# Patient Record
Sex: FEMALE | Race: ASIAN | NOT HISPANIC OR LATINO
[De-identification: names, ages, dates, MRNs, and addresses within clinical notes are randomized per-mention and may not be internally consistent; named-entity substitution may affect disease eponyms.]

---

## 2017-02-09 ENCOUNTER — APPOINTMENT (OUTPATIENT)
Dept: CARDIOLOGY | Facility: CLINIC | Age: 77
End: 2017-02-09

## 2017-03-27 ENCOUNTER — APPOINTMENT (OUTPATIENT)
Dept: CARDIOLOGY | Facility: CLINIC | Age: 77
End: 2017-03-27

## 2017-03-27 ENCOUNTER — NON-APPOINTMENT (OUTPATIENT)
Age: 77
End: 2017-03-27

## 2017-03-27 VITALS
HEART RATE: 59 BPM | BODY MASS INDEX: 25.1 KG/M2 | HEIGHT: 64 IN | DIASTOLIC BLOOD PRESSURE: 79 MMHG | OXYGEN SATURATION: 100 % | WEIGHT: 147 LBS | SYSTOLIC BLOOD PRESSURE: 165 MMHG

## 2017-03-27 VITALS — SYSTOLIC BLOOD PRESSURE: 135 MMHG | DIASTOLIC BLOOD PRESSURE: 80 MMHG

## 2017-03-27 DIAGNOSIS — R20.2 PARESTHESIA OF SKIN: ICD-10-CM

## 2017-03-27 RX ORDER — PRAVASTATIN SODIUM 40 MG/1
40 TABLET ORAL
Qty: 90 | Refills: 0 | Status: ACTIVE | COMMUNITY
Start: 2017-03-15

## 2017-04-06 ENCOUNTER — APPOINTMENT (OUTPATIENT)
Dept: CV DIAGNOSITCS | Facility: HOSPITAL | Age: 77
End: 2017-04-06

## 2017-04-06 ENCOUNTER — OUTPATIENT (OUTPATIENT)
Dept: OUTPATIENT SERVICES | Facility: HOSPITAL | Age: 77
LOS: 1 days | End: 2017-04-06
Payer: MEDICARE

## 2017-04-06 DIAGNOSIS — Z86.79 PERSONAL HISTORY OF OTHER DISEASES OF THE CIRCULATORY SYSTEM: ICD-10-CM

## 2017-04-06 PROCEDURE — 93306 TTE W/DOPPLER COMPLETE: CPT

## 2017-04-06 PROCEDURE — 93306 TTE W/DOPPLER COMPLETE: CPT | Mod: 26

## 2017-04-17 DIAGNOSIS — I42.9 CARDIOMYOPATHY, UNSPECIFIED: ICD-10-CM

## 2017-07-20 ENCOUNTER — APPOINTMENT (OUTPATIENT)
Dept: CARDIOLOGY | Facility: CLINIC | Age: 77
End: 2017-07-20

## 2017-07-20 ENCOUNTER — NON-APPOINTMENT (OUTPATIENT)
Age: 77
End: 2017-07-20

## 2017-07-20 VITALS — DIASTOLIC BLOOD PRESSURE: 70 MMHG | SYSTOLIC BLOOD PRESSURE: 132 MMHG

## 2017-07-20 VITALS
HEART RATE: 64 BPM | HEIGHT: 64 IN | OXYGEN SATURATION: 96 % | WEIGHT: 150 LBS | DIASTOLIC BLOOD PRESSURE: 78 MMHG | SYSTOLIC BLOOD PRESSURE: 152 MMHG | BODY MASS INDEX: 25.61 KG/M2

## 2018-04-10 ENCOUNTER — CHART COPY (OUTPATIENT)
Age: 78
End: 2018-04-10

## 2018-04-11 ENCOUNTER — APPOINTMENT (OUTPATIENT)
Dept: CARDIOLOGY | Facility: CLINIC | Age: 78
End: 2018-04-11
Payer: MEDICARE

## 2018-04-11 ENCOUNTER — OUTPATIENT (OUTPATIENT)
Dept: OUTPATIENT SERVICES | Facility: HOSPITAL | Age: 78
LOS: 1 days | End: 2018-04-11
Payer: MEDICARE

## 2018-04-11 ENCOUNTER — APPOINTMENT (OUTPATIENT)
Dept: CV DIAGNOSITCS | Facility: HOSPITAL | Age: 78
End: 2018-04-11

## 2018-04-11 ENCOUNTER — NON-APPOINTMENT (OUTPATIENT)
Age: 78
End: 2018-04-11

## 2018-04-11 VITALS
WEIGHT: 150 LBS | OXYGEN SATURATION: 98 % | HEART RATE: 51 BPM | DIASTOLIC BLOOD PRESSURE: 69 MMHG | BODY MASS INDEX: 25.61 KG/M2 | SYSTOLIC BLOOD PRESSURE: 130 MMHG | HEIGHT: 64 IN

## 2018-04-11 DIAGNOSIS — R20.2 PARESTHESIA OF SKIN: ICD-10-CM

## 2018-04-11 PROCEDURE — 93306 TTE W/DOPPLER COMPLETE: CPT | Mod: 26

## 2018-04-11 PROCEDURE — 93306 TTE W/DOPPLER COMPLETE: CPT

## 2018-04-11 PROCEDURE — 99214 OFFICE O/P EST MOD 30 MIN: CPT

## 2018-04-11 PROCEDURE — 93000 ELECTROCARDIOGRAM COMPLETE: CPT

## 2018-11-19 ENCOUNTER — NON-APPOINTMENT (OUTPATIENT)
Age: 78
End: 2018-11-19

## 2018-11-19 ENCOUNTER — APPOINTMENT (OUTPATIENT)
Dept: CARDIOLOGY | Facility: CLINIC | Age: 78
End: 2018-11-19
Payer: MEDICARE

## 2018-11-19 VITALS
SYSTOLIC BLOOD PRESSURE: 115 MMHG | OXYGEN SATURATION: 96 % | HEIGHT: 64 IN | BODY MASS INDEX: 26.8 KG/M2 | WEIGHT: 157 LBS | HEART RATE: 61 BPM | DIASTOLIC BLOOD PRESSURE: 72 MMHG

## 2018-11-19 PROCEDURE — 99214 OFFICE O/P EST MOD 30 MIN: CPT

## 2018-11-19 PROCEDURE — 93000 ELECTROCARDIOGRAM COMPLETE: CPT

## 2018-11-19 NOTE — PHYSICAL EXAM
[General Appearance - Well Developed] : well developed [Normal Appearance] : normal appearance [Well Groomed] : well groomed [General Appearance - Well Nourished] : well nourished [No Deformities] : no deformities [General Appearance - In No Acute Distress] : no acute distress [Normal Conjunctiva] : the conjunctiva exhibited no abnormalities [Eyelids - No Xanthelasma] : the eyelids demonstrated no xanthelasmas [Normal Oral Mucosa] : normal oral mucosa [No Oral Pallor] : no oral pallor [No Oral Cyanosis] : no oral cyanosis [Normal Jugular Venous A Waves Present] : normal jugular venous A waves present [Normal Jugular Venous V Waves Present] : normal jugular venous V waves present [No Jugular Venous Kovacs A Waves] : no jugular venous kovacs A waves [Respiration, Rhythm And Depth] : normal respiratory rhythm and effort [Exaggerated Use Of Accessory Muscles For Inspiration] : no accessory muscle use [Auscultation Breath Sounds / Voice Sounds] : lungs were clear to auscultation bilaterally [Heart Rate And Rhythm] : heart rate and rhythm were normal [Heart Sounds] : normal S1 and S2 [Murmurs] : no murmurs present [Abdomen Soft] : soft [Abdomen Tenderness] : non-tender [Abdomen Mass (___ Cm)] : no abdominal mass palpated [Abnormal Walk] : normal gait [Gait - Sufficient For Exercise Testing] : the gait was sufficient for exercise testing [Nail Clubbing] : no clubbing of the fingernails [Cyanosis, Localized] : no localized cyanosis [Petechial Hemorrhages (___cm)] : no petechial hemorrhages [Skin Color & Pigmentation] : normal skin color and pigmentation [] : no rash [No Venous Stasis] : no venous stasis [Skin Lesions] : no skin lesions [No Skin Ulcers] : no skin ulcer [No Xanthoma] : no  xanthoma was observed [Oriented To Time, Place, And Person] : oriented to person, place, and time [Affect] : the affect was normal [Mood] : the mood was normal [No Anxiety] : not feeling anxious

## 2018-11-19 NOTE — HISTORY OF PRESENT ILLNESS
[FreeTextEntry1] : patient is a 78-year-old female with history of diabetes, hypertension presented to follow up post multiple hospitalizations for a Takotsubo's syndrome with WENDY on B-blockers and feeling well with no new symptoms at this time. Dottie's PMD was concerned about her low HR on the Metoprolol but she denies any symptoms of dizziness, lightheadiness or syncope. \par \par Patient initially presented to our emergency room September 5, 2014 with chest pain and found to have positive cardiac enzymes. Patient had an echocardiogram that revealed severe segmental left ventricular dysfunction with left ventricular outflow tract obstruction with peak gradient of 64 mmHg. 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 Patient was treated with beta blockers and IV fluid. Prior to discharge on September 14, 2014 patient had a repeat echocardiogram that revealed mild segmental ventricular dysfunction.\par \par patient was rehospitalized in [place] on September 13, 2015 for worsening chest discomfort, was found to have dynamic EKG changes and sent for cardiac catheterization that revealed normal coronary arteries with significant left ventricular dysfunction LVOT hypercontractility. on September 14 patient had a repeat echocardiogram that showed improvement of her left ventricular function to an ejection fraction of 50%. Patient was discharged on her home regimen including Lopressor 12.5 twice a day.\par \par Today the patient feels well - denies chest pain, shortness of breath, palpitations, lightheadedness or syncope. the patient has been attempting to be more active by walking more frequently with her  and doing quan chi.

## 2018-11-19 NOTE — DISCUSSION/SUMMARY
[FreeTextEntry1] : patient is a 78-year-old female with history of diabetes, hypertension presented to follow up post recent hospitalization for a Takotsubo's syndrome with WENDY on B-blockers and feeling well with no new symptoms at this time. with subsequent LV function normalization and subsequent recurrent similar event on September 13, 2015 s/p repeat cath with normal coronaries.  patient is presently asymptomatic with no signs or symptoms of a complete heart failure, unstable angina or arrhythmias.\par - advised to continue her prior dose of the Metoprolol 25 mg bid; \par - ECG with no new changes\par - BP stable - slightly elevated - will monitor at home and monitor the salt intake\par - the echo from april 2018  shows complete normalization of the LV with EF of 65-70% with WENDY and minimal MR - no need for further work up at this time - patine tis asymptomatic\par - patient will continue Lopressor  25 mg twice daily. Patient was encouraged to continue with her current medical therapy with the hope of reducing the chances of recurrent stress-induced cardiomyopathy.\par - in light of her significant risk factors, will continue with aspirin as primary prevention for coronary artery disease.\par - Patient was encouraged to exercise, decrease salt intake and lose weight.\par - blood work was reviewed with the patient

## 2018-11-19 NOTE — REASON FOR VISIT
[Follow-Up - Clinic] : a clinic follow-up of [Cardiomyopathy] : cardiomyopathy [Hypertension] : hypertension [FreeTextEntry1] : Takotsubo's syndrome

## 2019-06-03 ENCOUNTER — APPOINTMENT (OUTPATIENT)
Dept: CARDIOLOGY | Facility: CLINIC | Age: 79
End: 2019-06-03

## 2020-01-02 ENCOUNTER — APPOINTMENT (OUTPATIENT)
Dept: CARDIOLOGY | Facility: CLINIC | Age: 80
End: 2020-01-02
Payer: MEDICARE

## 2020-01-02 ENCOUNTER — NON-APPOINTMENT (OUTPATIENT)
Age: 80
End: 2020-01-02

## 2020-01-02 VITALS
DIASTOLIC BLOOD PRESSURE: 77 MMHG | OXYGEN SATURATION: 99 % | HEIGHT: 64 IN | HEART RATE: 63 BPM | SYSTOLIC BLOOD PRESSURE: 162 MMHG

## 2020-01-02 PROCEDURE — 99214 OFFICE O/P EST MOD 30 MIN: CPT

## 2020-01-02 PROCEDURE — 93000 ELECTROCARDIOGRAM COMPLETE: CPT

## 2020-01-03 NOTE — PHYSICAL EXAM
[General Appearance - Well Developed] : well developed [Well Groomed] : well groomed [Normal Appearance] : normal appearance [General Appearance - Well Nourished] : well nourished [No Deformities] : no deformities [General Appearance - In No Acute Distress] : no acute distress [Normal Conjunctiva] : the conjunctiva exhibited no abnormalities [Eyelids - No Xanthelasma] : the eyelids demonstrated no xanthelasmas [Normal Oral Mucosa] : normal oral mucosa [No Oral Pallor] : no oral pallor [No Oral Cyanosis] : no oral cyanosis [Normal Jugular Venous A Waves Present] : normal jugular venous A waves present [Normal Jugular Venous V Waves Present] : normal jugular venous V waves present [No Jugular Venous Kovacs A Waves] : no jugular venous kovacs A waves [Respiration, Rhythm And Depth] : normal respiratory rhythm and effort [Exaggerated Use Of Accessory Muscles For Inspiration] : no accessory muscle use [Auscultation Breath Sounds / Voice Sounds] : lungs were clear to auscultation bilaterally [Heart Sounds] : normal S1 and S2 [Heart Rate And Rhythm] : heart rate and rhythm were normal [Abdomen Soft] : soft [Murmurs] : no murmurs present [Abdomen Tenderness] : non-tender [Abdomen Mass (___ Cm)] : no abdominal mass palpated [Abnormal Walk] : normal gait [Gait - Sufficient For Exercise Testing] : the gait was sufficient for exercise testing [Cyanosis, Localized] : no localized cyanosis [Nail Clubbing] : no clubbing of the fingernails [Petechial Hemorrhages (___cm)] : no petechial hemorrhages [Skin Color & Pigmentation] : normal skin color and pigmentation [] : no rash [No Skin Ulcers] : no skin ulcer [Skin Lesions] : no skin lesions [No Venous Stasis] : no venous stasis [No Xanthoma] : no  xanthoma was observed [Oriented To Time, Place, And Person] : oriented to person, place, and time [No Anxiety] : not feeling anxious [Affect] : the affect was normal [Mood] : the mood was normal [Normal] : normal [Normal Rate] : normal [Rhythm Regular] : regular [2+] : left 2+

## 2020-01-03 NOTE — ASSESSMENT
[FreeTextEntry1] :  79 year old female with history of diabetes, hypertension with history of  Takotsubo syndrome with WENDY on B-blockers and feeling well with no new symptoms at this time. \par \par Blood pressure today is significantly elevated: \par 162/77\par Patient's daughter acknowledges that her mother eats "very salty" foods\par Added Losartan 25 mg daily\par Encouraged patient and her family to decrease salt intake\par BP log for the next week\par \par Full blood panel: CBC, CMP, Hgb A1C, TSH, Lipid profile\par \par Follow up in 2 months\par \par \par \par

## 2020-01-03 NOTE — HISTORY OF PRESENT ILLNESS
[FreeTextEntry1] :  78-year-old female with history of diabetes, hypertension presented to follow up post multiple hospitalizations for a Takotsubo syndrome with WENDY on B-blockers and feeling well with no new symptoms at this time. Patient's PMD was concerned about her low HR on the Metoprolol but she denies any symptoms of dizziness, light headiness or syncope. \par \par Patient initially presented to our emergency room September 5, 2014 with chest pain and found to have positive cardiac enzymes. Patient had an echocardiogram that revealed severe segmental left ventricular dysfunction with left ventricular outflow tract obstruction with peak gradient of 64 mmHg. 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 Patient was treated with beta blockers and IV fluid. Prior to discharge on September 14, 2014 patient had a repeat echocardiogram that revealed mild segmental ventricular dysfunction.\par \par Patient was rehospitalized in [place] on September 13, 2015 for worsening chest discomfort, was found to have dynamic EKG changes and sent for cardiac catheterization that revealed normal coronary arteries with significant left ventricular dysfunction LVOT hypercontractility. on September 14 patient had a repeat echocardiogram that showed improvement of her left ventricular function to an ejection fraction of 50%. Patient was discharged on her home regimen including Lopressor 12.5 twice a day.\par \par Today the patient feels well - denies chest pain, shortness of breath, palpitations, lightheadedness or syncope. the patient has been attempting to be more active by walking more frequently with her  and doing quan chi.\par \par Interval Note\par January 2, 2020\par \par Patient returns for a cardiac follow up. She is a Mandarin speaking female that is accompanied by her English speaking daughter for translation.\par \par  Blood pressure today is in poor control at 162/77. Her primary care MD had added Losartan 50 mg, however, she did not take this medication out of concern that the medication may have been on recall. \par  does admit to significant dietary indiscretion over the holidays\par \par EKG today is in sinus rhythm @ 63 bpm. \par Most recent echocardiogram was performed on April 11, 2019 which demonstrated :\par systolic anterior motion of the mitral valve, minimal MR, mild AR.\par LVEF 74%\par compared with echo from 2017, no significant changes were identified. \par \par \par \par

## 2020-01-06 LAB
ALBUMIN SERPL ELPH-MCNC: 4.4 G/DL
ALP BLD-CCNC: 59 U/L
ALT SERPL-CCNC: 15 U/L
ANION GAP SERPL CALC-SCNC: 10 MMOL/L
AST SERPL-CCNC: 17 U/L
BASOPHILS # BLD AUTO: 0.01 K/UL
BASOPHILS NFR BLD AUTO: 0.2 %
BILIRUB SERPL-MCNC: 0.2 MG/DL
BUN SERPL-MCNC: 22 MG/DL
CALCIUM SERPL-MCNC: 9.4 MG/DL
CHLORIDE SERPL-SCNC: 104 MMOL/L
CHOLEST SERPL-MCNC: 163 MG/DL
CHOLEST/HDLC SERPL: 2.8 RATIO
CO2 SERPL-SCNC: 27 MMOL/L
CREAT SERPL-MCNC: 0.82 MG/DL
EOSINOPHIL # BLD AUTO: 0.13 K/UL
EOSINOPHIL NFR BLD AUTO: 2.4 %
ESTIMATED AVERAGE GLUCOSE: 143 MG/DL
GLUCOSE SERPL-MCNC: 168 MG/DL
HBA1C MFR BLD HPLC: 6.6 %
HCT VFR BLD CALC: 36 %
HDLC SERPL-MCNC: 59 MG/DL
HGB BLD-MCNC: 11.6 G/DL
IMM GRANULOCYTES NFR BLD AUTO: 0.2 %
LDLC SERPL CALC-MCNC: 75 MG/DL
LYMPHOCYTES # BLD AUTO: 1.7 K/UL
LYMPHOCYTES NFR BLD AUTO: 31.8 %
MAN DIFF?: NORMAL
MCHC RBC-ENTMCNC: 31 PG
MCHC RBC-ENTMCNC: 32.2 GM/DL
MCV RBC AUTO: 96.3 FL
MONOCYTES # BLD AUTO: 0.39 K/UL
MONOCYTES NFR BLD AUTO: 7.3 %
NEUTROPHILS # BLD AUTO: 3.1 K/UL
NEUTROPHILS NFR BLD AUTO: 58.1 %
PLATELET # BLD AUTO: 182 K/UL
POTASSIUM SERPL-SCNC: 4.1 MMOL/L
PROT SERPL-MCNC: 6.4 G/DL
RBC # BLD: 3.74 M/UL
RBC # FLD: 13.2 %
SODIUM SERPL-SCNC: 141 MMOL/L
TRIGL SERPL-MCNC: 147 MG/DL
TSH SERPL-ACNC: 1.16 UIU/ML
WBC # FLD AUTO: 5.34 K/UL

## 2020-03-12 ENCOUNTER — APPOINTMENT (OUTPATIENT)
Dept: CARDIOLOGY | Facility: CLINIC | Age: 80
End: 2020-03-12

## 2020-06-09 ENCOUNTER — APPOINTMENT (OUTPATIENT)
Dept: CARDIOLOGY | Facility: CLINIC | Age: 80
End: 2020-06-09
Payer: MEDICARE

## 2020-06-09 DIAGNOSIS — Z86.79 PERSONAL HISTORY OF OTHER DISEASES OF THE CIRCULATORY SYSTEM: ICD-10-CM

## 2020-06-09 DIAGNOSIS — R06.02 SHORTNESS OF BREATH: ICD-10-CM

## 2020-06-09 PROCEDURE — 99215 OFFICE O/P EST HI 40 MIN: CPT | Mod: 95

## 2020-06-09 NOTE — HISTORY OF PRESENT ILLNESS
[Other Location: e.g. Home (Enter Location, City,State)___] : at [unfilled] [Home] : at home, [unfilled] , at the time of the visit. [FreeTextEntry1] :  78-year-old female with history of diabetes, hypertension presented to follow up post multiple hospitalizations for a Takotsubo syndrome with WENDY on B-blockers and feeling well with no new symptoms at this time. Patient's PMD was concerned about her low HR on the Metoprolol but she denies any symptoms of dizziness, light headiness or syncope. \par \par Patient initially presented to our emergency room September 5, 2014 with chest pain and found to have positive cardiac enzymes. Patient had an echocardiogram that revealed severe segmental left ventricular dysfunction with left ventricular outflow tract obstruction with peak gradient of 64 mmHg. 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 Patient was treated with beta blockers and IV fluid. Prior to discharge on September 14, 2014 patient had a repeat echocardiogram that revealed mild segmental ventricular dysfunction.\par \par Patient was rehospitalized in [place] on September 13, 2015 for worsening chest discomfort, was found to have dynamic EKG changes and sent for cardiac catheterization that revealed normal coronary arteries with significant left ventricular dysfunction LVOT hypercontractility. on September 14 patient had a repeat echocardiogram that showed improvement of her left ventricular function to an ejection fraction of 50%. Patient was discharged on her home regimen including Lopressor 12.5 twice a day.\par \par Today the patient feels well - denies chest pain, shortness of breath, palpitations, lightheadedness or syncope. the patient has been attempting to be more active by walking more frequently with her  and doing quan chi.\par \par Interval Note\par January 2, 2020\par  Blood pressure today is in poor control at 162/77. Her primary care MD had added Losartan 50 mg, however, she did not take this medication out of concern that the medication may have been on recall. \par  does admit to significant dietary indiscretion over the holidays\par \par EKG today is in sinus rhythm @ 63 bpm. \par Most recent echocardiogram was performed on April 11, 2019 which demonstrated :\par systolic anterior motion of the mitral valve, minimal MR, mild AR.\par LVEF 74%\par compared with echo from 2017, no significant changes were identified. \par \par Interval Note\par Telehealth Visit\par June 9, 2020\par \par 80 year old Mandarin speaking female with past medical history as discussed above:\par diabetes, hypertension with multiple hospital hospitalizations for a Takotsubo syndrome with WENDY.\par \par Her daughter, Monica is patient's . \par \par Blood pressure reported today is in slightly improved control. She continues to take Losartan 25 mg, Metoprolol 25 mg BID to regulate heart rate and BP.\par \par She continues taking Pravastatin for cholesterol management and her PCP added Welchol .\par Additionally, her PCP increased her Metformin dosing from 500 mg daily to 750 mg after she was notified of patient's elevated Hgb A1C. \par \par Of note, daughter reports that her mother's daily finger sticks are at 100 or below since increasing her Metformin dosage. \par \par Currently, patient has been complaining of left arm numbness that she describes as positional.\par Her daughter has noticed that her mother is short of breath when she climbs the stairs or exerts herself. \par \par Assessment/PLAN\par Patient has had multiple coronary evaluations that do not demonstrate obstructive disease.\par Last echo was performed one year ago in April of 2019 which has been stable-\par demonstrating systolic anterior motion of the mitral valve, minimal MR, mild AR\par LVEF 74%.\par Plan to repeat echo. If unchanged, will consider referring to pulmonary. \par \par \par L\par \par \par \par \par \par \par \par \par \par \par \par \par

## 2020-06-10 RX ORDER — COLESEVELAM HYDROCHLORIDE 625 MG/1
625 TABLET, COATED ORAL
Refills: 0 | Status: ACTIVE | COMMUNITY
Start: 2020-06-10

## 2020-06-18 ENCOUNTER — APPOINTMENT (OUTPATIENT)
Dept: CV DIAGNOSITCS | Facility: HOSPITAL | Age: 80
End: 2020-06-18

## 2020-08-27 ENCOUNTER — APPOINTMENT (OUTPATIENT)
Dept: CV DIAGNOSITCS | Facility: HOSPITAL | Age: 80
End: 2020-08-27

## 2020-08-27 ENCOUNTER — OUTPATIENT (OUTPATIENT)
Dept: OUTPATIENT SERVICES | Facility: HOSPITAL | Age: 80
LOS: 1 days | End: 2020-08-27
Payer: MEDICARE

## 2020-08-27 DIAGNOSIS — R06.02 SHORTNESS OF BREATH: ICD-10-CM

## 2020-08-27 PROCEDURE — 93306 TTE W/DOPPLER COMPLETE: CPT | Mod: 26

## 2020-08-27 PROCEDURE — 93306 TTE W/DOPPLER COMPLETE: CPT

## 2020-09-09 ENCOUNTER — APPOINTMENT (OUTPATIENT)
Dept: CARDIOLOGY | Facility: CLINIC | Age: 80
End: 2020-09-09
Payer: MEDICARE

## 2020-09-09 VITALS
BODY MASS INDEX: 24.41 KG/M2 | HEIGHT: 64 IN | HEART RATE: 64 BPM | DIASTOLIC BLOOD PRESSURE: 81 MMHG | SYSTOLIC BLOOD PRESSURE: 151 MMHG | OXYGEN SATURATION: 98 % | WEIGHT: 143 LBS

## 2020-09-09 PROCEDURE — 99215 OFFICE O/P EST HI 40 MIN: CPT

## 2020-09-09 PROCEDURE — 93000 ELECTROCARDIOGRAM COMPLETE: CPT

## 2020-09-09 RX ORDER — KRILL/OM-3/DHA/EPA/PHOSPHO/AST 1000-230MG
81 CAPSULE ORAL DAILY
Qty: 90 | Refills: 0 | Status: ACTIVE | COMMUNITY
Start: 2020-09-09 | End: 1900-01-01

## 2020-09-09 RX ORDER — PRAVASTATIN SODIUM 40 MG/1
40 TABLET ORAL
Refills: 0 | Status: ACTIVE | COMMUNITY
Start: 2020-09-09

## 2020-09-09 NOTE — PHYSICAL EXAM
[General Appearance - Well Developed] : well developed [Normal Appearance] : normal appearance [No Deformities] : no deformities [Well Groomed] : well groomed [General Appearance - Well Nourished] : well nourished [Eyelids - No Xanthelasma] : the eyelids demonstrated no xanthelasmas [Normal Conjunctiva] : the conjunctiva exhibited no abnormalities [General Appearance - In No Acute Distress] : no acute distress [No Oral Pallor] : no oral pallor [Normal Oral Mucosa] : normal oral mucosa [Normal Jugular Venous V Waves Present] : normal jugular venous V waves present [Normal Jugular Venous A Waves Present] : normal jugular venous A waves present [No Oral Cyanosis] : no oral cyanosis [Respiration, Rhythm And Depth] : normal respiratory rhythm and effort [No Jugular Venous Kovacs A Waves] : no jugular venous kovacs A waves [Exaggerated Use Of Accessory Muscles For Inspiration] : no accessory muscle use [Auscultation Breath Sounds / Voice Sounds] : lungs were clear to auscultation bilaterally [Normal] : normal [Rhythm Regular] : regular [Normal S1] : normal S1 [Normal Rate] : normal [Normal S2] : normal S2 [2+] : Carotid: right 2+ [Abdomen Soft] : soft [Abdomen Tenderness] : non-tender [Abdomen Mass (___ Cm)] : no abdominal mass palpated [Abnormal Walk] : normal gait [Gait - Sufficient For Exercise Testing] : the gait was sufficient for exercise testing [Petechial Hemorrhages (___cm)] : no petechial hemorrhages [Nail Clubbing] : no clubbing of the fingernails [Cyanosis, Localized] : no localized cyanosis [Skin Color & Pigmentation] : normal skin color and pigmentation [] : no ischemic changes [No Skin Ulcers] : no skin ulcer [Skin Lesions] : no skin lesions [No Venous Stasis] : no venous stasis [No Xanthoma] : no  xanthoma was observed [Mood] : the mood was normal [Affect] : the affect was normal [Oriented To Time, Place, And Person] : oriented to person, place, and time [No Anxiety] : not feeling anxious

## 2020-09-10 NOTE — DISCUSSION/SUMMARY
[FreeTextEntry1] : \par In summary, Ms. Sultana is a 78-year-old female with history of diabetes, hypertension, Takotsubo syndrome with WENDY presented to follow up accompanied both by her  and daughter ( for translation).  Echocardiogram  performed on April 11, 2019 showed systolic anterior motion of the mitral valve, minimal MR, mild AR.\par LVEF 74%compared with echo from 2017, no significant changes were identified. \par She home blood pressures were 120 - 160's systolic and diastolic no.s are 70- 90's. \par Doing well. Continue current meds\par 1) Cardiomyopathy - Resolved. EF 74 % \par Stable\par \par 2) HTN - Continue Losartan 25 mg QD and Metoprolol 25 mg \par Renewed meds\par Advised BP logs \par Continue asa \par If BP remains elevated will D/c Metop and consider Carvedilol. \par \par 3) DM - Continue Metformin  mg \par \par 4) HLD - Continue Pravachol 40 mg QD \par Follow up in 6 months \par Labs ordered cbc, cmp, tsh, lipids ( to be done as outpatient ( fasting )

## 2020-09-10 NOTE — HISTORY OF PRESENT ILLNESS
[FreeTextEntry1] :  78-year-old female with history of diabetes, hypertension presented to follow up accompanied both by her  and daughter ( for translation )\par She carries a past medical history of Takotsubo syndrome with WENDY . Echocardiogram  performed on April 11, 2019 showed systolic anterior motion of the mitral valve, minimal MR, mild AR.\par LVEF 74%compared with echo from 2017, no significant changes were identified. \par Her home blood pressures were 120 - 160's systolic and diastolic no.s are 70- 90's. \par She denies  chest pain, shortness of breath, dizziness. palps, syncope or near syncope. \par She walks daily for 1 hour and is able to do activities of daily living without any restrictions. \par \par \par \par \par

## 2021-04-05 ENCOUNTER — NON-APPOINTMENT (OUTPATIENT)
Age: 81
End: 2021-04-05

## 2021-04-05 ENCOUNTER — APPOINTMENT (OUTPATIENT)
Dept: CARDIOLOGY | Facility: CLINIC | Age: 81
End: 2021-04-05

## 2021-04-05 ENCOUNTER — APPOINTMENT (OUTPATIENT)
Dept: CARDIOLOGY | Facility: CLINIC | Age: 81
End: 2021-04-05
Payer: MEDICARE

## 2021-04-05 VITALS
HEIGHT: 64 IN | SYSTOLIC BLOOD PRESSURE: 141 MMHG | OXYGEN SATURATION: 97 % | HEART RATE: 66 BPM | DIASTOLIC BLOOD PRESSURE: 79 MMHG

## 2021-04-05 PROCEDURE — 93000 ELECTROCARDIOGRAM COMPLETE: CPT

## 2021-04-05 PROCEDURE — 99215 OFFICE O/P EST HI 40 MIN: CPT

## 2021-04-05 NOTE — PHYSICAL EXAM
[General Appearance - Well Developed] : well developed [Normal Appearance] : normal appearance [Well Groomed] : well groomed [General Appearance - Well Nourished] : well nourished [No Deformities] : no deformities [General Appearance - In No Acute Distress] : no acute distress [Normal Conjunctiva] : the conjunctiva exhibited no abnormalities [Eyelids - No Xanthelasma] : the eyelids demonstrated no xanthelasmas [Normal Oral Mucosa] : normal oral mucosa [No Oral Pallor] : no oral pallor [No Oral Cyanosis] : no oral cyanosis [Normal Jugular Venous A Waves Present] : normal jugular venous A waves present [Normal Jugular Venous V Waves Present] : normal jugular venous V waves present [No Jugular Venous Kovacs A Waves] : no jugular venous kovacs A waves [Respiration, Rhythm And Depth] : normal respiratory rhythm and effort [Exaggerated Use Of Accessory Muscles For Inspiration] : no accessory muscle use [Auscultation Breath Sounds / Voice Sounds] : lungs were clear to auscultation bilaterally [Abdomen Soft] : soft [Abdomen Tenderness] : non-tender [Abdomen Mass (___ Cm)] : no abdominal mass palpated [Abnormal Walk] : normal gait [Gait - Sufficient For Exercise Testing] : the gait was sufficient for exercise testing [Nail Clubbing] : no clubbing of the fingernails [Cyanosis, Localized] : no localized cyanosis [Petechial Hemorrhages (___cm)] : no petechial hemorrhages [Skin Color & Pigmentation] : normal skin color and pigmentation [] : no rash [No Venous Stasis] : no venous stasis [Skin Lesions] : no skin lesions [No Skin Ulcers] : no skin ulcer [No Xanthoma] : no  xanthoma was observed [Oriented To Time, Place, And Person] : oriented to person, place, and time [Affect] : the affect was normal [Mood] : the mood was normal [No Anxiety] : not feeling anxious [Normal] : normal [Normal Rate] : normal [Rhythm Regular] : regular [Normal S1] : normal S1 [Normal S2] : normal S2 [2+] : left 2+

## 2021-04-05 NOTE — HISTORY OF PRESENT ILLNESS
[FreeTextEntry1] : 80-year-old female with history of diabetes, hypertension presented to follow up accompanied both by her  and daughter ( for translation )\par She carries a past medical history of Takotsubo syndrome with WENDY . Echocardiogram  performed on April 11, 2019 showed systolic anterior motion of the mitral valve, minimal MR, mild AR.\par LVEF 74%compared with echo from 2017, no significant changes were identified. \par Her home blood pressures were 120 - 160's systolic and diastolic no.s are 70- 90's. \par She denies  chest pain, shortness of breath, dizziness. palps, syncope or near syncope. \par She walks daily for 1 hour and is able to do activities of daily living without any restrictions. \par \par \par \par \par

## 2021-04-05 NOTE — DISCUSSION/SUMMARY
[FreeTextEntry1] : \par In summary, Ms. Sultana is a 80-year-old female with history of diabetes, hypertension, Takotsubo syndrome with WENDY presented to follow up accompanied both by her  and daughter ( for translation).  Echocardiogram  performed on April 11, 2019 showed systolic anterior motion of the mitral valve, minimal MR, mild AR.\par LVEF 74%compared with echo from 2017, no significant changes were identified. \par She home blood pressures were 120 - 160's systolic and diastolic no.s are 70- 90's. \par Doing well. Continue current meds\par 1) Cardiomyopathy - Resolved. EF 74 % \par Stable\par \par 2) HTN - Continue Losartan 25 mg QD and Metoprolol 25 mg \par Renewed meds\par Advised BP logs \par Continue asa \par If BP remains elevated will D/c Metop and consider Carvedilol. \par \par 3) DM - Continue Metformin  mg \par \par 4) HLD - Continue Pravachol 40 mg QD \par Follow up in 6 months \par Labs ordered cbc, cmp, tsh, lipids ( to be done as outpatient ( fasting )

## 2021-04-12 ENCOUNTER — RX RENEWAL (OUTPATIENT)
Age: 81
End: 2021-04-12

## 2021-11-01 ENCOUNTER — APPOINTMENT (OUTPATIENT)
Dept: CARDIOLOGY | Facility: CLINIC | Age: 81
End: 2021-11-01
Payer: MEDICARE

## 2021-11-01 VITALS
HEART RATE: 55 BPM | DIASTOLIC BLOOD PRESSURE: 76 MMHG | BODY MASS INDEX: 26.29 KG/M2 | SYSTOLIC BLOOD PRESSURE: 136 MMHG | WEIGHT: 154 LBS | OXYGEN SATURATION: 99 % | HEIGHT: 64 IN

## 2021-11-01 DIAGNOSIS — E11.9 TYPE 2 DIABETES MELLITUS W/OUT COMPLICATIONS: ICD-10-CM

## 2021-11-01 DIAGNOSIS — I10 ESSENTIAL (PRIMARY) HYPERTENSION: ICD-10-CM

## 2021-11-01 DIAGNOSIS — Z00.00 ENCOUNTER FOR GENERAL ADULT MEDICAL EXAMINATION W/OUT ABNORMAL FINDINGS: ICD-10-CM

## 2021-11-01 DIAGNOSIS — E78.5 HYPERLIPIDEMIA, UNSPECIFIED: ICD-10-CM

## 2021-11-01 PROCEDURE — 93000 ELECTROCARDIOGRAM COMPLETE: CPT

## 2021-11-01 PROCEDURE — 99214 OFFICE O/P EST MOD 30 MIN: CPT

## 2021-11-01 RX ORDER — METFORMIN ER 500 MG 500 MG/1
500 TABLET ORAL
Refills: 0 | Status: ACTIVE | COMMUNITY
Start: 2017-03-01

## 2021-11-01 NOTE — DISCUSSION/SUMMARY
[FreeTextEntry1] : In summary, Ms. Sultana is a 80-year-old female with history of diabetes, hypertension, Takotsubo syndrome with WENDY presented to follow up accompanied both by her  and daughter ( for translation).  Echocardiogram  performed on April 11, 2019 showed systolic anterior motion of the mitral valve, minimal MR, mild AR.\par LVEF 74%compared with echo from 2017, no significant changes were identified. \par She home blood pressures were 120 - 160's systolic and diastolic no.s are 70- 90's. \par Doing well. Continue current meds\par 1) Cardiomyopathy - Resolved. EF 74 % \par Stable\par \par 2) HTN - Continue Losartan 25 mg QD and Metoprolol 25 mg \par Renewed meds\par Advised BP logs \par Continue asa \par If BP remains elevated will D/c Metop and consider Carvedilol. \par \par 3) DM - Continue Metformin  mg \par \par 4) HLD - Continue Pravachol 40 mg QD \par Follow up in 6 months \par

## 2021-11-01 NOTE — HISTORY OF PRESENT ILLNESS
[FreeTextEntry1] : 80-year-old female with history of diabetes, hypertension presented to follow up accompanied both by her  and daughter ( for translation )\par She carries a past medical history of Takotsubo syndrome with WENDY . Echocardiogram  performed on April 11, 2019 showed systolic anterior motion of the mitral valve, minimal MR, mild AR.\par LVEF 74%compared with echo from 2017, no significant changes were identified. Furthermore, Echo performed in 4/2018 showed no segmental wall motion abnormalities. There is upper septal hypertrophy 1.3 cm without any obstruction. \par Her home blood pressures were 120 - 160's systolic and diastolic no.s are 70- 90's. She feels well and denies  chest pain, shortness of breath, dizziness. palps, syncope or near syncope. \par She walks daily for 1 hour and is able to do activities of daily living without any restrictions. \par \par \par \par \par

## 2022-06-09 ENCOUNTER — NON-APPOINTMENT (OUTPATIENT)
Age: 82
End: 2022-06-09

## 2022-06-09 ENCOUNTER — APPOINTMENT (OUTPATIENT)
Dept: CARDIOLOGY | Facility: CLINIC | Age: 82
End: 2022-06-09
Payer: MEDICARE

## 2022-06-09 VITALS
BODY MASS INDEX: 26.58 KG/M2 | HEART RATE: 54 BPM | SYSTOLIC BLOOD PRESSURE: 133 MMHG | HEIGHT: 63 IN | DIASTOLIC BLOOD PRESSURE: 75 MMHG | OXYGEN SATURATION: 99 % | WEIGHT: 150 LBS

## 2022-06-09 PROCEDURE — 99214 OFFICE O/P EST MOD 30 MIN: CPT

## 2022-06-09 PROCEDURE — 93000 ELECTROCARDIOGRAM COMPLETE: CPT

## 2022-06-09 NOTE — CARDIOLOGY SUMMARY
[___] : [unfilled] [Normal] : normal [de-identified] : 6/9/22\par \par SB 50's non specific T wave changes

## 2022-06-09 NOTE — DISCUSSION/SUMMARY
[FreeTextEntry1] : In summary, Ms. Sultana is a 82-year-old female with history of diabetes, hypertension, Takotsubo syndrome with WENDY presented to follow up accompanied both by her  and daughter ( for translation).  Echocardiogram  performed on April 11, 2019 showed systolic anterior motion of the mitral valve, minimal MR, mild AR.\par LVEF 74%compared with echo from 2020, no significant changes were identified. \par She home blood pressures were 120 - 160's systolic and diastolic no.s are 70- 90's. \par Doing well. Continue current meds\par 1) Cardiomyopathy - Resolved. EF 74 % \par - with recent mild SOB/[place] - will refer for repeat TTE\par \par 2) HTN - Continue Losartan 25 mg QD and Metoprolol 25 mg \par Renewed meds\par Advised BP logs. Encouraged the patient to monitor blood pressure at home, keep a log, and report results back to us for evaluation. Based on results, we will adjust the regimen as necessary.\par Continue asa \par If BP remains elevated will D/c Metop and consider Carvedilol. \par \par 3) DM - Continue Metformin  mg - last hgba1c - 6.3\par \par 4) HLD - Continue Pravachol 40 mg QD - LFTs WNL\par Follow up in 6 months \par

## 2022-06-09 NOTE — HISTORY OF PRESENT ILLNESS
[FreeTextEntry1] : 82-year- old female with history of diabetes, hypertension presented to follow up accompanied both by her  and daughter ( for translation )\par She carries a past medical history of Takotsubo syndrome with WENDY . Echocardiogram  performed on April 11, 2019 showed systolic anterior motion of the mitral valve, minimal MR, mild AR.\par LVEF 74%compared with echo from 2017, no significant changes were identified. Furthermore, Echo performed in 4/2018 showed no segmental wall motion abnormalities. There is upper septal hypertrophy 1.3 cm without any obstruction. \par Her home blood pressures were 120 - 160's systolic and diastolic no.s are 70- 90's. She feels well and denies  chest pain, shortness of breath, dizziness. palps, syncope or near syncope. \par She walks daily for 1 hour and is able to do activities of daily living without any restrictions. \par \par \par Interval Note\par June 9, 2022\par \par Ms. Sultana is now an 82 year old Mandarin speaking female that returns for a cardiovascular follow up.\par She carries a past medical  history as outlined below:\par \par -Hx of Diabetes\par -Hx of hypertension\par -Hx of Hyperlipidemia \par -Hx of Takotsubo syndrome with WENDY with multiple hospitalizations \par \par Last echocardiogram was performed on August 27, 2020\par \par Normal LV internal dimensions and wall thicknesses\par Normal LV systolic function\par No segmental wall motion abnormalities\par Upper septal hypertrophy 1.3 cm without obstruction\par Indeterminate diastolic function\par NOrmal filling pressures\par NOrmal RV size and function\par \par **Compared with echo from 4/11/18, no WENDY noted\par \par Blood pressure today well controlled\par EKG - SB with no changes\par \par \par \par \par \par \par \par \par

## 2022-08-25 ENCOUNTER — OUTPATIENT (OUTPATIENT)
Dept: OUTPATIENT SERVICES | Facility: HOSPITAL | Age: 82
LOS: 1 days | End: 2022-08-25
Payer: MEDICARE

## 2022-08-25 ENCOUNTER — APPOINTMENT (OUTPATIENT)
Dept: CV DIAGNOSITCS | Facility: HOSPITAL | Age: 82
End: 2022-08-25

## 2022-08-25 DIAGNOSIS — Z86.79 PERSONAL HISTORY OF OTHER DISEASES OF THE CIRCULATORY SYSTEM: ICD-10-CM

## 2022-08-25 PROCEDURE — 93306 TTE W/DOPPLER COMPLETE: CPT | Mod: 26

## 2022-08-25 PROCEDURE — 93306 TTE W/DOPPLER COMPLETE: CPT

## 2022-09-09 ENCOUNTER — OUTPATIENT (OUTPATIENT)
Dept: OUTPATIENT SERVICES | Facility: HOSPITAL | Age: 82
LOS: 1 days | End: 2022-09-09
Payer: MEDICARE

## 2022-09-09 ENCOUNTER — APPOINTMENT (OUTPATIENT)
Dept: RADIOLOGY | Facility: IMAGING CENTER | Age: 82
End: 2022-09-09

## 2022-09-09 DIAGNOSIS — Z00.8 ENCOUNTER FOR OTHER GENERAL EXAMINATION: ICD-10-CM

## 2022-09-09 PROCEDURE — 77080 DXA BONE DENSITY AXIAL: CPT | Mod: 26

## 2022-09-09 PROCEDURE — 77080 DXA BONE DENSITY AXIAL: CPT

## 2022-12-02 ENCOUNTER — NON-APPOINTMENT (OUTPATIENT)
Age: 82
End: 2022-12-02

## 2022-12-02 ENCOUNTER — APPOINTMENT (OUTPATIENT)
Dept: CARDIOLOGY | Facility: CLINIC | Age: 82
End: 2022-12-02

## 2022-12-02 VITALS
HEART RATE: 57 BPM | BODY MASS INDEX: 26.58 KG/M2 | HEIGHT: 63 IN | SYSTOLIC BLOOD PRESSURE: 136 MMHG | DIASTOLIC BLOOD PRESSURE: 71 MMHG | WEIGHT: 150 LBS | OXYGEN SATURATION: 96 %

## 2022-12-02 PROCEDURE — 99214 OFFICE O/P EST MOD 30 MIN: CPT

## 2022-12-02 PROCEDURE — 93000 ELECTROCARDIOGRAM COMPLETE: CPT

## 2022-12-02 NOTE — CARDIOLOGY SUMMARY
[___] : [unfilled] [Normal] : normal [de-identified] : 12/2/22\par \par SB 50's non specific T wave changes

## 2022-12-02 NOTE — DISCUSSION/SUMMARY
[FreeTextEntry1] : In summary, Ms. Sultana is a 82-year-old female with history of diabetes, hypertension, Takotsubo syndrome with WENDY presented to follow up accompanied both by her  and daughter ( for translation).  Echocardiogram  performed on April 11, 2019 showed systolic anterior motion of the mitral valve, minimal MR, mild AR.\par LVEF 74%compared with echo from 2020, no significant changes were identified. \par She home blood pressures were 120 - 160's systolic and diastolic no.s are 70- 90's. \par Doing well. Continue current meds\par 1) Cardiomyopathy - Resolved. EF 74 % \par - with recent mild SOB/[place] - will refer for repeat TTE\par \par 2) HTN - Continue Losartan 25 mg QD and Metoprolol 25 mg \par Renewed meds\par Advised BP logs. Encouraged the patient to monitor blood pressure at home, keep a log, and report results back to us for evaluation. Based on results, we will adjust the regimen as necessary.\par Continue asa \par If BP remains elevated will D/c Metop and consider Carvedilol. \par \par 3) DM - Continue Metformin  mg - last hgba1c - 6.3\par \par 4) HLD - Continue Pravachol 40 mg QD - LFTs WNL\par Follow up in 6 months \par  [EKG obtained to assist in diagnosis and management of assessed problem(s)] : EKG obtained to assist in diagnosis and management of assessed problem(s)

## 2022-12-02 NOTE — HISTORY OF PRESENT ILLNESS
[FreeTextEntry1] : 82-year- old female with history of diabetes, hypertension presented to follow up accompanied both by her  and daughter ( for translation )\par She carries a past medical history of Takotsubo syndrome with WENDY . Echocardiogram  performed on April 11, 2019 showed systolic anterior motion of the mitral valve, minimal MR, mild AR.\par LVEF 74%compared with echo from 2017, no significant changes were identified. Furthermore, Echo performed in 4/2018 showed no segmental wall motion abnormalities. There is upper septal hypertrophy 1.3 cm without any obstruction. \par Her home blood pressures were 120 - 160's systolic and diastolic no.s are 70- 90's. She feels well and denies  chest pain, shortness of breath, dizziness. palps, syncope or near syncope. \par She walks daily for 1 hour and is able to do activities of daily living without any restrictions. \par \par \par Interval Note\par 12/2/22\par \par Ms. Sultana is now an 82 year old Mandarin speaking female that returns for a cardiovascular follow up.\par She carries a past medical  history as outlined below:\par \par -Hx of Diabetes\par -Hx of hypertension\par -Hx of Hyperlipidemia \par -Hx of Takotsubo syndrome with WENDY with multiple hospitalizations \par \par Last echocardiogram was performed on August 27, 2020\par \par Normal LV internal dimensions and wall thicknesses\par Normal LV systolic function\par No segmental wall motion abnormalities\par Upper septal hypertrophy 1.3 cm without obstruction\par Indeterminate diastolic function\par NOrmal filling pressures\par NOrmal RV size and function\par \par **Compared with echo from 4/11/18, no WENDY noted\par \par Blood pressure today well controlled\par EKG - SB with no changes\par \par \par \par \par \par \par \par \par

## 2022-12-02 NOTE — END OF VISIT
"SW followed up with pt's daughter, Rhea [phone] to gain additional SNF choices and she stated "I couldn't come up with any, and looking at that ratings on the other facilities, they are not of really good quality and I don't want to send my mom somewhere, where she wouldn't be care for". SW provided active listening and acknowledged her concerns, SW then suggested that pt go home with home health services. Rhea would now like to pursue inpatient rehab at [facility] and [facility]. SW explained that, that level of therapy is not recommended and more than likely be denied.     Following afternoon huddle, myself, KULWINDER Mullen Rn and Dr. Sharif met with pt face to face to discuss d/c planning, her niece Yesenia was at her bedside. Pt really wants to go home and is agreeable to home health services. Yesenia reports pt having all necessary DME at home and strong family support. Pt is expected to discharge tomorrow with her family providing transportation.     CHON will consult PHN today and order will be sent tomorrow.   " [Time Spent: ___ minutes] : I have spent [unfilled] minutes of time on the encounter.

## 2023-07-05 ENCOUNTER — APPOINTMENT (OUTPATIENT)
Dept: CARDIOLOGY | Facility: CLINIC | Age: 83
End: 2023-07-05
Payer: MEDICARE

## 2023-07-05 VITALS
OXYGEN SATURATION: 98 % | WEIGHT: 150 LBS | HEIGHT: 63 IN | SYSTOLIC BLOOD PRESSURE: 146 MMHG | HEART RATE: 73 BPM | BODY MASS INDEX: 26.58 KG/M2 | DIASTOLIC BLOOD PRESSURE: 76 MMHG

## 2023-07-05 PROCEDURE — 93000 ELECTROCARDIOGRAM COMPLETE: CPT

## 2023-07-05 PROCEDURE — 99214 OFFICE O/P EST MOD 30 MIN: CPT

## 2023-07-05 RX ORDER — LOSARTAN POTASSIUM 50 MG/1
50 TABLET, FILM COATED ORAL DAILY
Qty: 90 | Refills: 5 | Status: ACTIVE | COMMUNITY
Start: 2020-01-02 | End: 1900-01-01

## 2023-07-05 NOTE — CARDIOLOGY SUMMARY
[___] : [unfilled] [Normal] : normal [de-identified] : 7/5/23\par \par SB 50's non specific T wave changes

## 2023-07-05 NOTE — DISCUSSION/SUMMARY
[EKG obtained to assist in diagnosis and management of assessed problem(s)] : EKG obtained to assist in diagnosis and management of assessed problem(s) [FreeTextEntry1] : In summary, Ms. Sultana is a 83-year-old female with history of diabetes, hypertension, Takotsubo syndrome with WENDY presented to follow up accompanied both by her  and daughter ( for translation).  Echocardiogram  performed on April 11, 2019 showed systolic anterior motion of the mitral valve, minimal MR, mild AR.\par LVEF 74%compared with echo from 2020, no significant changes were identified. \par She home blood pressures were 120 - 160's systolic and diastolic no.s are 70- 90's. \par Doing well. Continue current meds\par 1) Cardiomyopathy - Resolved. EF 74 % \par - with recent mild SOB/[place] - will refer for repeat TTE\par \par 2) HTN - recently Losartan increased to 50 mg QD and c/w  Metoprolol 25 mg \par Renewed meds\par Advised BP logs. Encouraged the patient to monitor blood pressure at home, keep a log, and report results back to us for evaluation. Based on results, we will adjust the regimen as necessary.\par Continue asa \par If BP remains elevated will D/c Metop and consider Carvedilol. \par \par 3) DM - Continue Metformin  mg - last hgba1c - 6.3\par \par 4) HLD - Continue Pravachol 40 mg QD - LFTs WNL\par Follow up in 6 months \par

## 2023-07-31 ENCOUNTER — APPOINTMENT (OUTPATIENT)
Dept: HEART AND VASCULAR | Facility: CLINIC | Age: 83
End: 2023-07-31
Payer: MEDICARE

## 2023-07-31 PROCEDURE — 93306 TTE W/DOPPLER COMPLETE: CPT

## 2024-01-29 ENCOUNTER — APPOINTMENT (OUTPATIENT)
Dept: CARDIOLOGY | Facility: CLINIC | Age: 84
End: 2024-01-29
Payer: MEDICARE

## 2024-01-29 VITALS
WEIGHT: 150 LBS | BODY MASS INDEX: 26.58 KG/M2 | DIASTOLIC BLOOD PRESSURE: 73 MMHG | OXYGEN SATURATION: 95 % | HEART RATE: 63 BPM | HEIGHT: 63 IN | SYSTOLIC BLOOD PRESSURE: 156 MMHG

## 2024-01-29 PROCEDURE — 93000 ELECTROCARDIOGRAM COMPLETE: CPT

## 2024-01-29 PROCEDURE — 99214 OFFICE O/P EST MOD 30 MIN: CPT

## 2024-01-29 NOTE — DISCUSSION/SUMMARY
[FreeTextEntry1] : In summary, Ms. Sultana is a 83-year-old female with history of diabetes, hypertension, Takotsubo syndrome with WENDY presented to follow up accompanied both by her  and daughter ( for translation).  Echocardiogram  performed on April 11, 2019 showed systolic anterior motion of the mitral valve, minimal MR, mild AR.\par  LVEF 74%compared with echo from 2020, no significant changes were identified. \par  She home blood pressures were 120 - 160's systolic and diastolic no.s are 70- 90's. \par  Doing well. Continue current meds\par  1) Cardiomyopathy - Resolved. EF 74 % \par  - with recent mild SOB/[place] - will refer for repeat TTE\par  \par  2) HTN - recently Losartan increased to 50 mg QD and c/w  Metoprolol 25 mg \par  Renewed meds\par  Advised BP logs. Encouraged the patient to monitor blood pressure at home, keep a log, and report results back to us for evaluation. Based on results, we will adjust the regimen as necessary.\par  Continue asa \par  If BP remains elevated will D/c Metop and consider Carvedilol. \par  \par  3) DM - Continue Metformin  mg - last hgba1c - 6.3\par  \par  4) HLD - Continue Pravachol 40 mg QD - LFTs WNL\par  Follow up in 6 months \par   [EKG obtained to assist in diagnosis and management of assessed problem(s)] : EKG obtained to assist in diagnosis and management of assessed problem(s)

## 2024-01-29 NOTE — HISTORY OF PRESENT ILLNESS
[FreeTextEntry1] : 83-year- old female with history of diabetes, hypertension presented to follow up accompanied both by her . we used language [employer] for translation   She carries a past medical history of Takotsubo syndrome with WENDY . Echocardiogram  performed on April 11, 2019 showed systolic anterior motion of the mitral valve, minimal MR, mild AR. LVEF 74%compared with echo from 2017, no significant changes were identified. Furthermore, Echo performed in 4/2018 showed no segmental wall motion abnormalities. There is upper septal hypertrophy 1.3 cm without any obstruction.  Her home blood pressures were 120 - 160's systolic and diastolic no.s are 70- 90's. She feels well and denies  chest pain, shortness of breath, dizziness. palps, syncope or near syncope.  She walks daily for 1 hour and is able to do activities of daily living without any restrictions.    Interval Note 1/29/24  Ms. Sultana is now an 83 year old Mandarin speaking female that returns for a cardiovascular follow up. She carries a past medical  history as outlined below:  -Hx of Diabetes -Hx of hypertension -Hx of Hyperlipidemia  -Hx of Takotsubo syndrome with WENDY with multiple hospitalizations   Last echocardiogram was performed on August 27, 2020  Normal LV internal dimensions and wall thicknesses Normal LV systolic function No segmental wall motion abnormalities Upper septal hypertrophy 1.3 cm without obstruction Indeterminate diastolic function NOrmal filling pressures NOrmal RV size and function  **Compared with echo from 4/11/18, no WENDY noted  Blood pressure today well controlled EKG - SB with no changes

## 2024-06-11 ENCOUNTER — APPOINTMENT (OUTPATIENT)
Dept: CARDIOLOGY | Facility: CLINIC | Age: 84
End: 2024-06-11

## 2024-06-14 ENCOUNTER — APPOINTMENT (OUTPATIENT)
Dept: CARDIOLOGY | Facility: CLINIC | Age: 84
End: 2024-06-14

## 2025-05-21 ENCOUNTER — NON-APPOINTMENT (OUTPATIENT)
Age: 85
End: 2025-05-21

## 2025-05-23 ENCOUNTER — APPOINTMENT (OUTPATIENT)
Dept: CARDIOLOGY | Facility: CLINIC | Age: 85
End: 2025-05-23
Payer: MEDICARE

## 2025-05-23 VITALS
OXYGEN SATURATION: 97 % | HEIGHT: 63 IN | SYSTOLIC BLOOD PRESSURE: 149 MMHG | HEART RATE: 57 BPM | DIASTOLIC BLOOD PRESSURE: 75 MMHG

## 2025-05-23 DIAGNOSIS — I10 ESSENTIAL (PRIMARY) HYPERTENSION: ICD-10-CM

## 2025-05-23 DIAGNOSIS — R06.09 OTHER FORMS OF DYSPNEA: ICD-10-CM

## 2025-05-23 DIAGNOSIS — I35.1 NONRHEUMATIC AORTIC (VALVE) INSUFFICIENCY: ICD-10-CM

## 2025-05-23 DIAGNOSIS — E78.5 HYPERLIPIDEMIA, UNSPECIFIED: ICD-10-CM

## 2025-05-23 DIAGNOSIS — E11.9 TYPE 2 DIABETES MELLITUS W/OUT COMPLICATIONS: ICD-10-CM

## 2025-05-23 PROCEDURE — 93000 ELECTROCARDIOGRAM COMPLETE: CPT

## 2025-05-23 PROCEDURE — 99214 OFFICE O/P EST MOD 30 MIN: CPT

## 2025-05-28 ENCOUNTER — APPOINTMENT (OUTPATIENT)
Dept: CARDIOLOGY | Facility: CLINIC | Age: 85
End: 2025-05-28
Payer: MEDICARE

## 2025-05-28 PROCEDURE — 93306 TTE W/DOPPLER COMPLETE: CPT
